# Patient Record
Sex: MALE | Race: ASIAN | NOT HISPANIC OR LATINO
[De-identification: names, ages, dates, MRNs, and addresses within clinical notes are randomized per-mention and may not be internally consistent; named-entity substitution may affect disease eponyms.]

---

## 2020-07-13 ENCOUNTER — FORM ENCOUNTER (OUTPATIENT)
Age: 70
End: 2020-07-13

## 2022-05-06 ENCOUNTER — FORM ENCOUNTER (OUTPATIENT)
Age: 72
End: 2022-05-06

## 2022-05-12 ENCOUNTER — FORM ENCOUNTER (OUTPATIENT)
Age: 72
End: 2022-05-12

## 2022-06-03 ENCOUNTER — FORM ENCOUNTER (OUTPATIENT)
Age: 72
End: 2022-06-03

## 2022-06-12 ENCOUNTER — FORM ENCOUNTER (OUTPATIENT)
Age: 72
End: 2022-06-12

## 2022-09-16 ENCOUNTER — FORM ENCOUNTER (OUTPATIENT)
Age: 72
End: 2022-09-16

## 2022-10-25 PROBLEM — Z00.00 ENCOUNTER FOR PREVENTIVE HEALTH EXAMINATION: Status: ACTIVE | Noted: 2022-10-25

## 2022-11-09 ENCOUNTER — APPOINTMENT (OUTPATIENT)
Dept: HEPATOLOGY | Facility: CLINIC | Age: 72
End: 2022-11-09

## 2022-11-09 ENCOUNTER — FORM ENCOUNTER (OUTPATIENT)
Age: 72
End: 2022-11-09

## 2022-11-09 VITALS
BODY MASS INDEX: 19.7 KG/M2 | RESPIRATION RATE: 16 BRPM | HEIGHT: 63.78 IN | TEMPERATURE: 96.8 F | DIASTOLIC BLOOD PRESSURE: 76 MMHG | OXYGEN SATURATION: 97 % | WEIGHT: 114 LBS | HEART RATE: 89 BPM | SYSTOLIC BLOOD PRESSURE: 116 MMHG

## 2022-11-09 DIAGNOSIS — E78.5 HYPERLIPIDEMIA, UNSPECIFIED: ICD-10-CM

## 2022-11-09 DIAGNOSIS — N40.0 BENIGN PROSTATIC HYPERPLASIA WITHOUT LOWER URINARY TRACT SYMPMS: ICD-10-CM

## 2022-11-09 PROCEDURE — 99204 OFFICE O/P NEW MOD 45 MIN: CPT

## 2022-11-09 RX ORDER — GABAPENTIN 100 MG/1
100 CAPSULE ORAL
Qty: 60 | Refills: 0 | Status: ACTIVE | COMMUNITY
Start: 2022-09-15

## 2022-11-09 RX ORDER — TAMSULOSIN HYDROCHLORIDE 0.4 MG/1
0.4 CAPSULE ORAL
Refills: 0 | Status: ACTIVE | COMMUNITY
Start: 2022-10-27

## 2022-11-09 RX ORDER — PANCRELIPASE 120000; 24000; 76000 [USP'U]/1; [USP'U]/1; [USP'U]/1
24000-76000 CAPSULE, DELAYED RELEASE PELLETS ORAL
Qty: 90 | Refills: 0 | Status: ACTIVE | COMMUNITY
Start: 2022-10-27

## 2022-11-09 RX ORDER — DENOSUMAB 60 MG/ML
60 INJECTION SUBCUTANEOUS
Qty: 1 | Refills: 0 | Status: ACTIVE | COMMUNITY
Start: 2022-10-10

## 2022-11-09 RX ORDER — SIMVASTATIN 20 MG/1
20 TABLET, FILM COATED ORAL
Refills: 0 | Status: ACTIVE | COMMUNITY
Start: 2022-10-27

## 2022-11-28 NOTE — ASSESSMENT
[FreeTextEntry1] : 71 y/o M with PMH of chronic hep B presents to f/u. Pt currently on Vemlidy, tolerating and compliant. Pt said she has labs done with PCP couple weeks ago. He is scheduled for a abd MRI at [facility] next month. Pt reports he had EGD and colonoscopy done with Dr. Yany Garcia this year. \par \par Today pt reports feeling well. Denies fever, chills, n/v/d/c ,SOB, CP, HA, dizziness, abd pain, jaundice. \par \par \par Plans:\par \par HBV: Natural course of HBV discussed with pt. Advised to cont Vemlidy. Will get Labs results from PCP.\par \par HCC Screening: Abd MRI scheduled for next month. Will get results. \par \par Will get latest labs, imaging and note from [facility]. Will get EGD/Cscope results from GI.\par \par RTC 6 month.\par \par \par

## 2022-11-28 NOTE — HISTORY OF PRESENT ILLNESS
[FreeTextEntry1] : GI: Dr. Yany Potts\par \par 71 y/o M with PMH of chronic hep B presents to f/u. Pt currently on Vemlidy, tolerating and compliant. Pt said she has labs done with PCP couple weeks ago. He is scheduled for a abd MRI at [facility] next month. Pt reports he had EGD and colonoscopy done with Dr. Yany Garcia this year. \par \par Today pt reports feeling well. Denies fever, chills, n/v/d/c ,SOB, CP, HA, dizziness, abd pain, jaundice. \par \par \par \par \par Tobacco: Never \par Alcohol: no\par illicit drug: no \par Herb and dietary Supplement: no \par FMH of liver disease: brothers with cirrhosis, HCC, and s/p liver transplant. \par \par \par

## 2022-11-28 NOTE — REVIEW OF SYSTEMS
[Fever] : no fever [Chills] : no chills [Feeling Poorly] : not feeling poorly [Feeling Tired] : not feeling tired [Sore Throat] : no sore throat [Hoarseness] : no hoarseness [Chest Pain] : no chest pain [Palpitations] : no palpitations [Leg Claudication] : no intermittent leg claudication [Lower Ext Edema] : no extremity edema [Shortness Of Breath] : no shortness of breath [Wheezing] : no wheezing [Cough] : no cough [SOB on Exertion] : no shortness of breath during exertion [Abdominal Pain] : no abdominal pain [Vomiting] : no vomiting [Constipation] : no constipation [Diarrhea] : no diarrhea [Heartburn] : no heartburn [Confused] : no confusion [Dizziness] : no dizziness

## 2022-11-28 NOTE — PHYSICAL EXAM
[Non-Tender] : non-tender [General Appearance - Alert] : alert [General Appearance - In No Acute Distress] : in no acute distress [General Appearance - Well Nourished] : well nourished [General Appearance - Well Developed] : well developed [Sclera] : the sclera and conjunctiva were normal [Neck Appearance] : the appearance of the neck was normal [] : no respiratory distress [Exaggerated Use Of Accessory Muscles For Inspiration] : no accessory muscle use [Edema] : there was no peripheral edema [Veins - Varicosity Changes] : there were no varicosital changes [Abdomen Soft] : soft [Abdomen Tenderness] : non-tender [Abdomen Mass (___ Cm)] : no abdominal mass palpated [No Focal Deficits] : no focal deficits [Oriented To Time, Place, And Person] : oriented to person, place, and time [Scleral Icterus] : No Scleral Icterus [Spider Angioma] : No spider angioma(s) were observed [Abdominal  Ascites] : no ascites [Asterixis] : no asterixis observed [Jaundice] : No jaundice [Palmar Erythema] : no Palmar Erythema

## 2022-12-13 ENCOUNTER — FORM ENCOUNTER (OUTPATIENT)
Age: 72
End: 2022-12-13

## 2023-04-26 ENCOUNTER — FORM ENCOUNTER (OUTPATIENT)
Age: 73
End: 2023-04-26

## 2023-06-14 ENCOUNTER — APPOINTMENT (OUTPATIENT)
Dept: HEPATOLOGY | Facility: CLINIC | Age: 73
End: 2023-06-14
Payer: MEDICARE

## 2023-06-14 VITALS
BODY MASS INDEX: 20.4 KG/M2 | HEART RATE: 71 BPM | TEMPERATURE: 97.8 F | WEIGHT: 118 LBS | DIASTOLIC BLOOD PRESSURE: 76 MMHG | OXYGEN SATURATION: 97 % | SYSTOLIC BLOOD PRESSURE: 119 MMHG | RESPIRATION RATE: 18 BRPM

## 2023-06-14 PROCEDURE — 99213 OFFICE O/P EST LOW 20 MIN: CPT

## 2023-06-15 LAB
AFP-TM SERPL-MCNC: <1.8 NG/ML
ALBUMIN SERPL ELPH-MCNC: 4.5 G/DL
ALP BLD-CCNC: 51 U/L
ALT SERPL-CCNC: 14 U/L
ANION GAP SERPL CALC-SCNC: 10 MMOL/L
AST SERPL-CCNC: 17 U/L
BILIRUB SERPL-MCNC: 0.5 MG/DL
BUN SERPL-MCNC: 15 MG/DL
CALCIUM SERPL-MCNC: 9.2 MG/DL
CHLORIDE SERPL-SCNC: 106 MMOL/L
CO2 SERPL-SCNC: 25 MMOL/L
CREAT SERPL-MCNC: 0.91 MG/DL
EGFR: 90 ML/MIN/1.73M2
GLUCOSE SERPL-MCNC: 98 MG/DL
HBV E AB SER QL: REACTIVE
HBV E AG SER QL: NONREACTIVE
HEPB DNA PCR INT: NOT DETECTED
HEPB DNA PCR LOG: NOT DETECTED LOGIU/ML
INR PPP: 1.05 RATIO
POTASSIUM SERPL-SCNC: 4.3 MMOL/L
PROT SERPL-MCNC: 7.1 G/DL
PT BLD: 12.3 SEC
SODIUM SERPL-SCNC: 141 MMOL/L

## 2023-07-18 NOTE — ASSESSMENT
[FreeTextEntry1] : 72 y/o M with PMH of chronic hep B presents to f/u. Pt currently on Vemlidy, tolerating and compliant.Pt with extensive family history of cirrhosis and hcc.\par \par Today pt reports feeling well. Denies fever, chills, n/v/d/c ,SOB, CP, HA, dizziness, abd pain, jaundice. \par \par \par Plans:\par \par HBV: Natural course of HBV discussed with pt. Advised to cont Vemlidy. Labs in 6 months. \par \par HCC Screening: Abd MRI in 6 months. \par \par RTC 6 months.\par

## 2023-07-18 NOTE — REVIEW OF SYSTEMS
[Fever] : no fever [Chills] : no chills [Feeling Poorly] : not feeling poorly [Feeling Tired] : not feeling tired [Nosebleeds] : no nosebleeds [Nasal Discharge] : no nasal discharge [Sore Throat] : no sore throat [Hoarseness] : no hoarseness [Chest Pain] : no chest pain [Palpitations] : no palpitations [Lower Ext Edema] : no extremity edema [Shortness Of Breath] : no shortness of breath [Wheezing] : no wheezing [Cough] : no cough [SOB on Exertion] : no shortness of breath during exertion [Abdominal Pain] : no abdominal pain [Vomiting] : no vomiting [Constipation] : no constipation [Diarrhea] : no diarrhea [Melena] : no melena [Itching] : no itching [Confused] : no confusion [Dizziness] : no dizziness

## 2023-07-18 NOTE — HISTORY OF PRESENT ILLNESS
[FreeTextEntry1] : GI: Dr. Yany Potts\par \par 74 y/o M with PMH of chronic HBeAg nonreactive hep B presents to f/u. Pt currently on Vemlidy, tolerating and compliant. \par Today pt reports feeling well. Denies fever, chills, n/v/d/c ,SOB, CP, HA, dizziness, abd pain, jaundice. \par \par \par \par Tobacco: Never \par Alcohol: no\par illicit drug: no \par Herb and dietary Supplement: no \par FMH of liver disease: brothers with cirrhosis, HCC, and s/p liver transplant. \par \par \par Labs:\par 5/31/23\par INR 1.05\par H/H 13.9/42.2      WBC 3.53 \par AFP <1.8 \par AST/ALT 17/14   ALP 51   TB 0.5\par HBV PCR Not detected \par HBeAg nonreactive \par HBeAb reactive \par \par \par Imaging:\par Abd US 4/27/23: simple hepatic cyst. Right renal cyst. Liver normal in size and echogenicity. No mass. \par \par MRE 12/14/22: F1-F2. No evidence of cirrhosis, portal htn, hepatic steatosis, iron overload, or hepatocellular carcinoma. \par \par \par Procedures:\par Colonoscopy 5/7/22: preparation of colon was fair. Internal hemorrhoids. \par \par EGD 7/14/2020: Moderate gastritis. GERD. \par

## 2023-12-13 ENCOUNTER — APPOINTMENT (OUTPATIENT)
Dept: HEPATOLOGY | Facility: CLINIC | Age: 73
End: 2023-12-13
Payer: MEDICARE

## 2023-12-13 VITALS
RESPIRATION RATE: 18 BRPM | BODY MASS INDEX: 20.05 KG/M2 | WEIGHT: 116 LBS | TEMPERATURE: 98 F | OXYGEN SATURATION: 96 % | DIASTOLIC BLOOD PRESSURE: 71 MMHG | SYSTOLIC BLOOD PRESSURE: 116 MMHG | HEART RATE: 70 BPM

## 2023-12-13 DIAGNOSIS — K74.00 HEPATIC FIBROSIS, UNSPECIFIED: ICD-10-CM

## 2023-12-13 PROCEDURE — 99213 OFFICE O/P EST LOW 20 MIN: CPT

## 2023-12-13 NOTE — ASSESSMENT
[FreeTextEntry1] : 74 y/o M with PMH of presents to f/u. Pt currently on , tolerating and compliant.  # chronic HBeAg nonreactive hep B  #  MRE from 2022 F1-F2 12/14/22  HBV: Natural course of HBV discussed with pt. Advised to cont Vemlidy. Labs are overdue today.. Pt received Vemlidy from [facility]  Now we will ideliver Vemlidy to pt via [facility]  labs today Us abd in a month Fibroscan in a month RTO in a month

## 2023-12-13 NOTE — REASON FOR VISIT
[[employer] ] : provided by [employer]   [Interpreters_IDNumber] : [id number] [FreeTextEntry3] : Mandarin [TWNoteComboBox1] : Chinese

## 2023-12-13 NOTE — HISTORY OF PRESENT ILLNESS
[FreeTextEntry1] :  GI: Dr. Yany Potts  72 y/o M with PMH of chronic HBeAg nonreactive hep B presents to f/u. Pt currently on Vemlidy, tolerating and compliant.  Today pt reports feeling well. Denies fever, chills, n/v/d/c ,SOB, CP, HA, dizziness, abd pain, jaundice.  Interim hx: - Overdue labs & imaging - Last visit was July 2023 - last labs available 5/31/23 - as per last imaging done Abd US 4/27/23: simple hepatic cyst. Right renal cyst. Liver normal in size and echogenicity. No mass. - MRE from 2022 F1-F2 12/14/22  Tobacco: Never Alcohol: no illicit drug: no Herb and dietary Supplement: no FMH of liver disease: brothers with cirrhosis, HCC, and s/p liver transplant.  Labs: 5/31/23 INR 1.05 H/H 13.9/42.2  WBC 3.53 AFP <1.8 AST/ALT 17/14 ALP 51 TB 0.5 HBV PCR Not detected HBeAg nonreactive HBeAb reactive  Imaging: Abd US 4/27/23: simple hepatic cyst. Right renal cyst. Liver normal in size and echogenicity. No mass. MRE 12/14/22: F1-F2. No evidence of cirrhosis, portal htn, hepatic steatosis, iron overload, or hepatocellular carcinoma.  Procedures: Colonoscopy 5/7/22: preparation of colon was fair. Internal hemorrhoids.  EGD 7/14/2020: Moderate gastritis. GERD.

## 2023-12-15 LAB
AFP-TM SERPL-MCNC: <1.8 NG/ML
ALBUMIN SERPL ELPH-MCNC: 4.6 G/DL
ALP BLD-CCNC: 55 U/L
ALT SERPL-CCNC: 14 U/L
ANION GAP SERPL CALC-SCNC: 13 MMOL/L
AST SERPL-CCNC: 16 U/L
BASOPHILS # BLD AUTO: 0.03 K/UL
BASOPHILS NFR BLD AUTO: 0.7 %
BILIRUB SERPL-MCNC: 0.5 MG/DL
BUN SERPL-MCNC: 20 MG/DL
CALCIUM SERPL-MCNC: 9.1 MG/DL
CHLORIDE SERPL-SCNC: 104 MMOL/L
CO2 SERPL-SCNC: 24 MMOL/L
CREAT SERPL-MCNC: 0.97 MG/DL
EGFR: 83 ML/MIN/1.73M2
EOSINOPHIL # BLD AUTO: 0.1 K/UL
EOSINOPHIL NFR BLD AUTO: 2.2 %
GLUCOSE SERPL-MCNC: 113 MG/DL
HCT VFR BLD CALC: 41.4 %
HEPB DNA PCR INT: NOT DETECTED
HEPB DNA PCR LOG: NOT DETECTED LOGIU/ML
HGB BLD-MCNC: 13.8 G/DL
IMM GRANULOCYTES NFR BLD AUTO: 0.2 %
LYMPHOCYTES # BLD AUTO: 1.32 K/UL
LYMPHOCYTES NFR BLD AUTO: 29 %
MAN DIFF?: NORMAL
MCHC RBC-ENTMCNC: 29.2 PG
MCHC RBC-ENTMCNC: 33.3 GM/DL
MCV RBC AUTO: 87.7 FL
MONOCYTES # BLD AUTO: 0.39 K/UL
MONOCYTES NFR BLD AUTO: 8.6 %
NEUTROPHILS # BLD AUTO: 2.7 K/UL
NEUTROPHILS NFR BLD AUTO: 59.3 %
PLATELET # BLD AUTO: 139 K/UL
POTASSIUM SERPL-SCNC: 4.3 MMOL/L
PROT SERPL-MCNC: 7.4 G/DL
RBC # BLD: 4.72 M/UL
RBC # FLD: 13.2 %
SODIUM SERPL-SCNC: 140 MMOL/L
WBC # FLD AUTO: 4.55 K/UL

## 2023-12-21 ENCOUNTER — NON-APPOINTMENT (OUTPATIENT)
Age: 73
End: 2023-12-21

## 2024-02-28 ENCOUNTER — APPOINTMENT (OUTPATIENT)
Dept: HEPATOLOGY | Facility: CLINIC | Age: 74
End: 2024-02-28
Payer: MEDICARE

## 2024-02-28 VITALS
DIASTOLIC BLOOD PRESSURE: 75 MMHG | BODY MASS INDEX: 20.05 KG/M2 | HEART RATE: 73 BPM | RESPIRATION RATE: 18 BRPM | TEMPERATURE: 97.1 F | SYSTOLIC BLOOD PRESSURE: 115 MMHG | WEIGHT: 116 LBS | OXYGEN SATURATION: 94 %

## 2024-02-28 PROCEDURE — 99213 OFFICE O/P EST LOW 20 MIN: CPT

## 2024-02-28 NOTE — REVIEW OF SYSTEMS
[Fever] : no fever [Chills] : no chills [Feeling Poorly] : not feeling poorly [Feeling Tired] : not feeling tired [Nosebleeds] : no nosebleeds [Nasal Discharge] : no nasal discharge [Sore Throat] : no sore throat [Hoarseness] : no hoarseness [Palpitations] : no palpitations [Chest Pain] : no chest pain [Leg Claudication] : no intermittent leg claudication [Lower Ext Edema] : no extremity edema [Shortness Of Breath] : no shortness of breath [Wheezing] : no wheezing [SOB on Exertion] : no shortness of breath during exertion [Cough] : no cough [Abdominal Pain] : no abdominal pain [Vomiting] : no vomiting [Diarrhea] : no diarrhea [Constipation] : no constipation [Melena] : no melena [Itching] : no itching [Dizziness] : no dizziness

## 2024-02-28 NOTE — ASSESSMENT
[FreeTextEntry1] : 73 y/o M with PMH of presents to f/u. Pt currently on Vemlidy, tolerating and compliant.  # chronic HBeAg nonreactive hep B # MRE from 2022 F1-F2 12/14/22 #Abd US elastagrphy 12/18/23: Liver shear wave WNL.  HBV: Natural course of HBV discussed with pt. Advised to cont Vemlidy. Labs 12/13/23 LFTs WNL and HBV PCR ND.  Cont Vemlidy.   HCC screening: Abd US in June. Labs before next visit.   RTC in June 2024.

## 2024-02-28 NOTE — PHYSICAL EXAM
[Non-Tender] : non-tender [General Appearance - Alert] : alert [General Appearance - In No Acute Distress] : in no acute distress [General Appearance - Well Nourished] : well nourished [General Appearance - Well Developed] : well developed [General Appearance - Well-Appearing] : healthy appearing [Sclera] : the sclera and conjunctiva were normal [Neck Appearance] : the appearance of the neck was normal [] : no respiratory distress [Edema] : there was no peripheral edema [Veins - Varicosity Changes] : there were no varicosital changes [Abdomen Soft] : soft [Abdomen Tenderness] : non-tender [Abdomen Mass (___ Cm)] : no abdominal mass palpated [Oriented To Time, Place, And Person] : oriented to person, place, and time [Abdominal  Ascites] : no ascites [Scleral Icterus] : No Scleral Icterus [Jaundice] : No jaundice [Asterixis] : no asterixis observed

## 2024-02-28 NOTE — HISTORY OF PRESENT ILLNESS
[FreeTextEntry1] : GI: Dr. Yany Potts  75 y/o M with PMH of chronic HBeAg nonreactive hep B presents to f/u. Pt currently on Vemlidy, tolerating and compliant. Pt with extensive family history of cirrhosis and liver cancer. MRE from 2022 F1-F2 12/14/22  Interim hx 12/13/23: - Overdue labs & imaging - Last visit was July 2023 - last labs available 5/31/23 - as per last imaging done Abd US 4/27/23: simple hepatic cyst. Right renal cyst. Liver normal in size and echogenicity. No mass. - MRE from 2022 F1-F2 12/14/22  Interval Hx 2/28/24: Today pt reports feeling well. Denies fever, chills, n/v/d/c, abd pain, SOB, CP, coughing, blood in stool, black stool, confusion or jaundice. Reports sometimes he gets RUQ discomfort after a meal.   Tobacco: Never Alcohol: no illicit drug: no Herb and dietary Supplement: no FMH of liver disease: brothers with cirrhosis, HCC, and s/p liver transplant.  Labs: 12/15/23 AST/ALT 16/14   ALP 55  TB 0.5  H/H and WBC WNL AFP <1.8 HBV PCR ND   5/31/23 INR 1.05 H/H 13.9/42.2  WBC 3.53 AFP <1.8 AST/ALT 17/14 ALP 51 TB 0.5 HBV PCR Not detected HBeAg nonreactive HBeAb reactive  Imaging: Abd US elastagrphy 12/18/23: simple hepatic cyst. Cholelithiasis. Rt renal cyst. Liver shear wave WNL. Abd US 4/27/23: simple hepatic cyst. Right renal cyst. Liver normal in size and echogenicity. No mass. MRE 12/14/22: F1-F2. No evidence of cirrhosis, portal htn, hepatic steatosis, iron overload, or hepatocellular carcinoma.  Procedures: Colonoscopy 5/7/22: preparation of colon was fair. Internal hemorrhoids.  EGD 7/14/2020: Moderate gastritis. GERD.

## 2024-06-21 ENCOUNTER — NON-APPOINTMENT (OUTPATIENT)
Age: 74
End: 2024-06-21

## 2024-06-21 LAB
AFP-TM SERPL-MCNC: <1.8 NG/ML
ALBUMIN SERPL ELPH-MCNC: 4.7 G/DL
ALP BLD-CCNC: 61 U/L
ALT SERPL-CCNC: 15 U/L
ANION GAP SERPL CALC-SCNC: 13 MMOL/L
AST SERPL-CCNC: 19 U/L
BASOPHILS # BLD AUTO: 0.04 K/UL
BASOPHILS NFR BLD AUTO: 1 %
BILIRUB SERPL-MCNC: 0.6 MG/DL
BUN SERPL-MCNC: 16 MG/DL
CALCIUM SERPL-MCNC: 9.4 MG/DL
CHLORIDE SERPL-SCNC: 104 MMOL/L
CO2 SERPL-SCNC: 23 MMOL/L
CREAT SERPL-MCNC: 0.8 MG/DL
EGFR: 93 ML/MIN/1.73M2
EOSINOPHIL # BLD AUTO: 0.1 K/UL
EOSINOPHIL NFR BLD AUTO: 2.4 %
GLUCOSE SERPL-MCNC: 105 MG/DL
HCT VFR BLD CALC: 41.9 %
HEPB DNA PCR INT: NOT DETECTED
HEPB DNA PCR LOG: NOT DETECTED LOGIU/ML
HGB BLD-MCNC: 13.9 G/DL
IMM GRANULOCYTES NFR BLD AUTO: 0 %
LYMPHOCYTES # BLD AUTO: 1.3 K/UL
LYMPHOCYTES NFR BLD AUTO: 31.1 %
MAN DIFF?: NORMAL
MCHC RBC-ENTMCNC: 30.2 PG
MCHC RBC-ENTMCNC: 33.2 GM/DL
MCV RBC AUTO: 91.1 FL
MONOCYTES # BLD AUTO: 0.41 K/UL
MONOCYTES NFR BLD AUTO: 9.8 %
NEUTROPHILS # BLD AUTO: 2.33 K/UL
NEUTROPHILS NFR BLD AUTO: 55.7 %
PLATELET # BLD AUTO: 132 K/UL
POTASSIUM SERPL-SCNC: 4.1 MMOL/L
PROT SERPL-MCNC: 7.2 G/DL
RBC # BLD: 4.6 M/UL
RBC # FLD: 13.9 %
SODIUM SERPL-SCNC: 141 MMOL/L
WBC # FLD AUTO: 4.18 K/UL

## 2024-06-26 ENCOUNTER — APPOINTMENT (OUTPATIENT)
Dept: HEPATOLOGY | Facility: CLINIC | Age: 74
End: 2024-06-26

## 2024-06-26 VITALS
TEMPERATURE: 97.2 F | SYSTOLIC BLOOD PRESSURE: 92 MMHG | RESPIRATION RATE: 16 BRPM | WEIGHT: 110 LBS | BODY MASS INDEX: 19.01 KG/M2 | HEART RATE: 98 BPM | OXYGEN SATURATION: 95 % | DIASTOLIC BLOOD PRESSURE: 63 MMHG

## 2024-06-26 DIAGNOSIS — B18.1 CHRONIC VIRAL HEPATITIS B W/OUT DELTA-AGENT: ICD-10-CM

## 2024-06-26 PROCEDURE — 99214 OFFICE O/P EST MOD 30 MIN: CPT

## 2024-06-26 RX ORDER — TENOFOVIR ALAFENAMIDE 25 MG/1
25 TABLET ORAL
Qty: 90 | Refills: 3 | Status: ACTIVE | COMMUNITY
Start: 2022-10-17 | End: 1900-01-01

## 2025-01-08 ENCOUNTER — APPOINTMENT (OUTPATIENT)
Dept: HEPATOLOGY | Facility: CLINIC | Age: 75
End: 2025-01-08
Payer: MEDICARE

## 2025-01-08 VITALS
OXYGEN SATURATION: 97 % | DIASTOLIC BLOOD PRESSURE: 75 MMHG | BODY MASS INDEX: 19.53 KG/M2 | SYSTOLIC BLOOD PRESSURE: 115 MMHG | RESPIRATION RATE: 16 BRPM | HEART RATE: 72 BPM | WEIGHT: 113 LBS

## 2025-01-08 DIAGNOSIS — B18.1 CHRONIC VIRAL HEPATITIS B W/OUT DELTA-AGENT: ICD-10-CM

## 2025-01-08 PROCEDURE — 99213 OFFICE O/P EST LOW 20 MIN: CPT

## 2025-05-14 ENCOUNTER — APPOINTMENT (OUTPATIENT)
Dept: HEPATOLOGY | Facility: CLINIC | Age: 75
End: 2025-05-14

## 2025-05-14 VITALS
BODY MASS INDEX: 19.88 KG/M2 | RESPIRATION RATE: 16 BRPM | WEIGHT: 115 LBS | DIASTOLIC BLOOD PRESSURE: 84 MMHG | SYSTOLIC BLOOD PRESSURE: 131 MMHG | OXYGEN SATURATION: 98 % | HEART RATE: 63 BPM

## 2025-05-14 DIAGNOSIS — B18.1 CHRONIC VIRAL HEPATITIS B W/OUT DELTA-AGENT: ICD-10-CM

## 2025-05-14 PROCEDURE — 99213 OFFICE O/P EST LOW 20 MIN: CPT

## 2025-05-14 PROCEDURE — 76981 USE PARENCHYMA: CPT

## 2025-06-19 ENCOUNTER — APPOINTMENT (OUTPATIENT)
Dept: NEUROSURGERY | Facility: CLINIC | Age: 75
End: 2025-06-19
Payer: MEDICARE

## 2025-06-19 PROCEDURE — 99203 OFFICE O/P NEW LOW 30 MIN: CPT

## 2025-07-03 ENCOUNTER — APPOINTMENT (OUTPATIENT)
Dept: NEUROSURGERY | Facility: CLINIC | Age: 75
End: 2025-07-03

## 2025-07-03 PROCEDURE — 99212 OFFICE O/P EST SF 10 MIN: CPT | Mod: 93
